# Patient Record
Sex: FEMALE | Race: BLACK OR AFRICAN AMERICAN | URBAN - METROPOLITAN AREA
[De-identification: names, ages, dates, MRNs, and addresses within clinical notes are randomized per-mention and may not be internally consistent; named-entity substitution may affect disease eponyms.]

---

## 2019-09-03 ENCOUNTER — OP HISTORICAL/CONVERTED ENCOUNTER (OUTPATIENT)
Dept: OTHER | Age: 44
End: 2019-09-03

## 2023-02-13 ENCOUNTER — OFFICE VISIT (OUTPATIENT)
Dept: INTERNAL MEDICINE CLINIC | Age: 48
End: 2023-02-13
Payer: COMMERCIAL

## 2023-02-13 VITALS
DIASTOLIC BLOOD PRESSURE: 73 MMHG | HEIGHT: 71 IN | BODY MASS INDEX: 28.64 KG/M2 | RESPIRATION RATE: 18 BRPM | SYSTOLIC BLOOD PRESSURE: 123 MMHG | HEART RATE: 103 BPM | OXYGEN SATURATION: 98 % | WEIGHT: 204.6 LBS | TEMPERATURE: 97.7 F

## 2023-02-13 DIAGNOSIS — M25.642 STIFFNESS OF LEFT HAND JOINT: ICD-10-CM

## 2023-02-13 DIAGNOSIS — F33.1 MODERATE RECURRENT MAJOR DEPRESSION (HCC): Primary | ICD-10-CM

## 2023-02-13 DIAGNOSIS — Z11.59 NEED FOR HEPATITIS C SCREENING TEST: ICD-10-CM

## 2023-02-13 DIAGNOSIS — M25.641 STIFFNESS OF RIGHT HAND JOINT: ICD-10-CM

## 2023-02-13 DIAGNOSIS — G47.00 INSOMNIA, UNSPECIFIED TYPE: ICD-10-CM

## 2023-02-13 DIAGNOSIS — Z12.31 ENCOUNTER FOR SCREENING MAMMOGRAM FOR MALIGNANT NEOPLASM OF BREAST: ICD-10-CM

## 2023-02-13 DIAGNOSIS — R61 NIGHT SWEATS: ICD-10-CM

## 2023-02-13 DIAGNOSIS — R61 EXCESSIVE SWEATING: ICD-10-CM

## 2023-02-13 DIAGNOSIS — R06.83 SNORING: ICD-10-CM

## 2023-02-13 DIAGNOSIS — Z13.220 LIPID SCREENING: ICD-10-CM

## 2023-02-13 DIAGNOSIS — R40.0 DAYTIME SLEEPINESS: ICD-10-CM

## 2023-02-13 DIAGNOSIS — F43.81 PROLONGED GRIEF DISORDER: ICD-10-CM

## 2023-02-13 PROCEDURE — 99204 OFFICE O/P NEW MOD 45 MIN: CPT | Performed by: INTERNAL MEDICINE

## 2023-02-13 RX ORDER — MULTIVITAMIN
1 TABLET ORAL DAILY
COMMUNITY

## 2023-02-13 NOTE — PROGRESS NOTES
Elias Paula is a 50 y.o. female and presents with New Patient, Sweats, and Insomnia    Meme Chapin is here to establish care, no significant PMH, concerned about:     Sweats: she says for about 10 years she has been having night sweats, bfore they where not daily but now they have been daily, she wakes up completely wet. She says she has to  changed her sheets every night and last week it was so soaked the the mattress protector was soaked. She showed me a picture on her phone with her bed completely wet  She admits for snoring, she says probably in the past 6 months. Morning stiffness of both hands, with intermittent swelling of all knuckles, no erythema, sometimes she says they are throbbing. No other issues with any other joints noted  Denies any rashes  Denies any palpation of masses, bumps or enlarged lymph nodes that she does self  No recent travels in the past 6 months to anywhere outside of the [address] [facility]. Last travel outside of the [address] [facility] was [place] [address] in August.  She works from home. She says she thinks she has depression. Says 2 years ago her dad passed, since then she cries every day thinking about him, she eels hopeless, worthless every day, sometimes she has thoughts of being better dead than alive, but never thoughts of self harm. She has mom and 2 siblings but she's not close to them. She broke up with her partner from a 20 year relationship in December 2022. She says she has nor problem falling asleep, but she can't stay asleep the who;e night, she wakes up in the middle of the night, and starts thinking about dad, her life, ect and she can not go back to sleep. So she feels tired and sleepy during the day   She says she did counseling which did not help much, she did grief counseling with [email or web address] and     Her last mammogram was maybe in 2018 with ETHEL and then a re epat one at [facility], apparently diagnostic, will ask for  that, she needs to do mammogram now.      Review of Systems  Review of Systems   Constitutional:  Negative for chills, fatigue, fever and unexpected weight change. HENT:  Negative for congestion, ear pain, sneezing and sore throat. Eyes:  Negative for pain and discharge. Respiratory:  Negative for cough, shortness of breath and wheezing. Cardiovascular:  Negative for chest pain, palpitations and leg swelling. Gastrointestinal:  Negative for abdominal pain, blood in stool, constipation and diarrhea. Endocrine: Negative for polydipsia and polyuria. Genitourinary:  Negative for difficulty urinating, dysuria, frequency, hematuria and urgency. Musculoskeletal:  Negative for arthralgias, back pain and joint swelling. Skin:  Negative for rash. Allergic/Immunologic: Negative for environmental allergies and food allergies. Neurological:  Negative for dizziness, speech difficulty, weakness, light-headedness, numbness and headaches. Hematological:  Negative for adenopathy. Psychiatric/Behavioral:  Negative for behavioral problems (Depression), sleep disturbance and suicidal ideas. History reviewed. No pertinent past medical history. Past Surgical History:   Procedure Laterality Date    HX TUBAL LIGATION       Social History     Socioeconomic History    Marital status: SINGLE   Tobacco Use    Smoking status: Never    Smokeless tobacco: Never   Vaping Use    Vaping Use: Never used   Substance and Sexual Activity    Alcohol use:  Yes     Alcohol/week: 1.0 standard drink     Types: 1 Glasses of wine per week    Drug use: Never     Social Determinants of Health     Financial Resource Strain: Low Risk     Difficulty of Paying Living Expenses: Not hard at all   Food Insecurity: No Food Insecurity    Worried About Running Out of Food in the Last Year: Never true    Ran Out of Food in the Last Year: Never true     Family History   Problem Relation Age of Onset    Hypertension Mother     Heart Disease Father     Diabetes Father     Parkinson's Disease Father     Thyroid Disease Sister     Hypertension Sister      Current Outpatient Medications   Medication Sig Dispense Refill    multivitamin (ONE A DAY) tablet Take 1 Tablet by mouth daily. No Known Allergies    Objective:  Visit Vitals  /73 (BP 1 Location: Left upper arm, BP Patient Position: Sitting, BP Cuff Size: Adult)   Pulse (!) 103   Temp 97.7 °F (36.5 °C) (Temporal)   Resp 18   Ht 5' 11\" (1.803 m)   Wt 204 lb 9.6 oz (92.8 kg)   LMP 02/01/2023   SpO2 98%   BMI 28.54 kg/m²     Physical Exam:   Physical Exam  Constitutional:       General: She is not in acute distress. Appearance: Normal appearance. HENT:      Head: Normocephalic and atraumatic. Mouth/Throat:      Mouth: Mucous membranes are moist.   Eyes:      Extraocular Movements: Extraocular movements intact. Conjunctiva/sclera: Conjunctivae normal.      Pupils: Pupils are equal, round, and reactive to light. Cardiovascular:      Rate and Rhythm: Normal rate and regular rhythm. Pulses: Normal pulses. Heart sounds: Normal heart sounds. Pulmonary:      Effort: Pulmonary effort is normal.      Breath sounds: Normal breath sounds. Abdominal:      General: Abdomen is flat. Bowel sounds are normal. There is no distension. Palpations: Abdomen is soft. There is no mass. Tenderness: There is no abdominal tenderness. Musculoskeletal:         General: No swelling or deformity. Cervical back: Normal range of motion and neck supple. Right lower leg: No edema. Left lower leg: No edema. Lymphadenopathy:      Cervical: No cervical adenopathy. Skin:     General: Skin is warm and dry. Capillary Refill: Capillary refill takes less than 2 seconds. Coloration: Skin is not jaundiced or pale. Findings: No erythema or rash. Neurological:      General: No focal deficit present. Mental Status: She is alert and oriented to person, place, and time.    Psychiatric:         Mood and Affect: Mood normal.         Behavior: Behavior normal.         Thought Content: Thought content normal.         Judgment: Judgment normal.        No results found for this or any previous visit. Assessment/Plan: At this moment she is noted to retracted 43-year-old trying medication due to the black box warning for suicidal thoughts with SSRIs, we discussed about that, explained her that this is more in the young adult group age, gave her the information about Lexapro which is what I was thinking for her so she can read about it and make a decision. She has tried counseling for 2 years with no improvement. I am referring her to psychology to see if she can find something else I can help her. She will let me know next time when she decides about trying medication. She has very significant night sweats which is not normal, do all the following work-up considering other associated symptoms and some mild tachycardia in the physical exam      ICD-10-CM ICD-9-CM    1. Moderate recurrent major depression (HCC)  F33.1 296.32 REFERRAL TO PSYCHOLOGY      2. Prolonged grief disorder  F43.81 309.1 REFERRAL TO PSYCHOLOGY      3. Night sweats  R61 780.8 CBC WITH AUTOMATED DIFF      METABOLIC PANEL, COMPREHENSIVE      TSH 3RD GENERATION      T4, FREE      TRISTON, DIRECT, W/REFLEX      SED RATE (ESR)      C REACTIVE PROTEIN, QT      RHEUMATOID FACTOR, QT      SLEEP MEDICINE REFERRAL      ESTRADIOL      FSH AND LH      HIV 1/2 AG/AB, 4TH GENERATION,W RFLX CONFIRM      QUANTIFERON-TB GOLD PLUS      4. Excessive sweating  R61 780.8 SLEEP MEDICINE REFERRAL      5. Stiffness of right hand joint  M25.641 719.54 TRISTON, DIRECT, W/REFLEX      SED RATE (ESR)      C REACTIVE PROTEIN, QT      RHEUMATOID FACTOR, QT      6. Stiffness of left hand joint  M25.642 719.54 TRISTON, DIRECT, W/REFLEX      SED RATE (ESR)      C REACTIVE PROTEIN, QT      RHEUMATOID FACTOR, QT      7. Insomnia, unspecified type  G47.00 780.52 SLEEP MEDICINE REFERRAL      8. Snoring  R06.83 786.09 SLEEP MEDICINE REFERRAL      9. Daytime sleepiness  R40.0 780.54 SLEEP MEDICINE REFERRAL      10. Encounter for screening mammogram for malignant neoplasm of breast  Z12.31 V76.12 PA[facility]E MAMMO BI SCREENING INCL CAD      6. Need for hepatitis C screening test  Z11.59 V73.89 HEPATITIS C AB      12. Lipid screening  Z13.220 V77.91 LIPID PANEL        Orders Placed This Encounter    QUANTIFERON-TB GOLD PLUS ([facility] Default)    PAIGE MAMMO BI SCREENING INCL CAD     Standing Status:   Future     Standing Expiration Date:   3/13/2024     Order Specific Question:   Is Patient Pregnant? Answer:   No    CBC WITH AUTOMATED DIFF    METABOLIC PANEL, COMPREHENSIVE    TSH 3RD GENERATION    T4, FREE    LIPID PANEL    HEPATITIS C AB    TRISTON, DIRECT, W/REFLEX    SED RATE (ESR)    C REACTIVE PROTEIN, QT    RHEUMATOID FACTOR, QT    ESTRADIOL    FSH AND LH    HIV 1/2 AG/AB, 4TH GENERATION,W RFLX CONFIRM    SLEEP MEDICINE REFERRAL     Referral Priority:   Routine     Referral Type:   Consultation     Referral Reason:   Specialty Services Required     Referred to Provider:   Fernando Lilly MD     Number of Visits Requested:   1    REFERRAL TO PSYCHOLOGY     Referral Priority:   Routine     Referral Type:   Behavioral Health     Referral Reason:   Specialty Services Required     Referred to Provider:   Flor Day PHD     Number of Visits Requested:   1    multivitamin (ONE A DAY) tablet     Sig: Take 1 Tablet by mouth daily. lose weight, increase physical activity, follow low fat diet, follow low salt diet, routine labs ordered, call if any problems  There are no Patient Instructions on file for this visit. Follow-up and Dispositions    Return in about 3 months (around 5/13/2023).

## 2023-02-13 NOTE — PROGRESS NOTES
1. \"Have you been to the ER, urgent care clinic since your last visit? Hospitalized since your last visit? \" No    2. \"Have you seen or consulted any other health care providers outside of the [address] [place] [address] since your last visit? \" Yes When: Dec 2023 Where: Dr. Khalida Brizuela Reason for visit: PCP      3. For patients aged 39-70: Has the patient had a colonoscopy / FIT/ Cologuard? No      If the patient is female:    4. For patients aged 41-77: Has the patient had a mammogram within the past 2 years? No      5. For patients aged 21-65: Has the patient had a pap smear?    Pap smear done by Mary Edwards located at [facility] in [facility]   Patient presents with    New Patient    Sweats    Insomnia     Visit Vitals  /73 (BP 1 Location: Left upper arm, BP Patient Position: Sitting, BP Cuff Size: Adult)   Pulse (!) 103   Temp 97.7 °F (36.5 °C) (Temporal)   Resp 18   Ht 5' 11\" (1.803 m)   Wt 204 lb 9.6 oz (92.8 kg)   LMP 02/01/2023   SpO2 98%   BMI 28.54 kg/m²     Previous PCP- Dr. Khalida Brizuela located in [id number] [facility]

## 2023-02-17 LAB
ALBUMIN SERPL-MCNC: 4.3 G/DL (ref 3.8–4.8)
ALBUMIN/GLOB SERPL: 1.7 {RATIO} (ref 1.2–2.2)
ALP SERPL-CCNC: 79 IU/L (ref 44–121)
ALT SERPL-CCNC: 12 IU/L (ref 0–32)
ANA SER QL: NEGATIVE
AST SERPL-CCNC: 17 IU/L (ref 0–40)
BASOPHILS # BLD AUTO: 0.1 X10E3/UL (ref 0–0.2)
BASOPHILS NFR BLD AUTO: 1 %
BILIRUB SERPL-MCNC: 0.3 MG/DL (ref 0–1.2)
BUN SERPL-MCNC: 8 MG/DL (ref 6–24)
BUN/CREAT SERPL: 9 (ref 9–23)
CALCIUM SERPL-MCNC: 9.5 MG/DL (ref 8.7–10.2)
CHLORIDE SERPL-SCNC: 102 MMOL/L (ref 96–106)
CHOLEST SERPL-MCNC: 210 MG/DL (ref 100–199)
CO2 SERPL-SCNC: 21 MMOL/L (ref 20–29)
CREAT SERPL-MCNC: 0.88 MG/DL (ref 0.57–1)
CRP SERPL-MCNC: 3 MG/L (ref 0–10)
EGFRCR SERPLBLD CKD-EPI 2021: 81 ML/MIN/1.73
EOSINOPHIL # BLD AUTO: 0.2 X10E3/UL (ref 0–0.4)
EOSINOPHIL NFR BLD AUTO: 2 %
ERYTHROCYTE [DISTWIDTH] IN BLOOD BY AUTOMATED COUNT: 13.1 % (ref 11.7–15.4)
ERYTHROCYTE [SEDIMENTATION RATE] IN BLOOD BY WESTERGREN METHOD: 14 MM/HR (ref 0–32)
ESTRADIOL SERPL-MCNC: 64.3 PG/ML
FSH SERPL-ACNC: 7.3 MIU/ML
GAMMA INTERFERON BACKGROUND BLD IA-ACNC: 0.09 IU/ML
GLOBULIN SER CALC-MCNC: 2.6 G/DL (ref 1.5–4.5)
GLUCOSE SERPL-MCNC: 82 MG/DL (ref 70–99)
HCT VFR BLD AUTO: 37.7 % (ref 34–46.6)
HCV IGG SERPL QL IA: NON REACTIVE
HDLC SERPL-MCNC: 43 MG/DL
HGB BLD-MCNC: 11.8 G/DL (ref 11.1–15.9)
HIV 1+2 AB+HIV1 P24 AG SERPL QL IA: NON REACTIVE
IMM GRANULOCYTES # BLD AUTO: 0.1 X10E3/UL (ref 0–0.1)
IMM GRANULOCYTES NFR BLD AUTO: 1 %
LDLC SERPL CALC-MCNC: 140 MG/DL (ref 0–99)
LH SERPL-ACNC: 7.6 MIU/ML
LYMPHOCYTES # BLD AUTO: 2.5 X10E3/UL (ref 0.7–3.1)
LYMPHOCYTES NFR BLD AUTO: 23 %
M TB IFN-G BLD-IMP: NEGATIVE
M TB IFN-G CD4+ T-CELLS BLD-ACNC: 0.15 IU/ML
M TBIFN-G CD4+ CD8+T-CELLS BLD-ACNC: 0.22 IU/ML
MCH RBC QN AUTO: 26.8 PG (ref 26.6–33)
MCHC RBC AUTO-ENTMCNC: 31.3 G/DL (ref 31.5–35.7)
MCV RBC AUTO: 86 FL (ref 79–97)
MITOGEN IGNF BLD-ACNC: >10 IU/ML
MONOCYTES # BLD AUTO: 1 X10E3/UL (ref 0.1–0.9)
MONOCYTES NFR BLD AUTO: 9 %
NEUTROPHILS # BLD AUTO: 7.3 X10E3/UL (ref 1.4–7)
NEUTROPHILS NFR BLD AUTO: 64 %
PLATELET # BLD AUTO: 507 X10E3/UL (ref 150–450)
POTASSIUM SERPL-SCNC: 4 MMOL/L (ref 3.5–5.2)
PROT SERPL-MCNC: 6.9 G/DL (ref 6–8.5)
QUANTIFERON INCUBATION, QF1T: NORMAL
RBC # BLD AUTO: 4.41 X10E6/UL (ref 3.77–5.28)
SERVICE CMNT-IMP: NORMAL
SODIUM SERPL-SCNC: 138 MMOL/L (ref 134–144)
T4 FREE SERPL-MCNC: 1.34 NG/DL (ref 0.82–1.77)
TRIGL SERPL-MCNC: 150 MG/DL (ref 0–149)
TSH SERPL DL<=0.005 MIU/L-ACNC: 1.27 UIU/ML (ref 0.45–4.5)
VLDLC SERPL CALC-MCNC: 27 MG/DL (ref 5–40)
WBC # BLD AUTO: 11.1 X10E3/UL (ref 3.4–10.8)

## 2023-03-15 ENCOUNTER — TELEPHONE (OUTPATIENT)
Dept: INTERNAL MEDICINE CLINIC | Age: 48
End: 2023-03-15

## 2023-03-15 NOTE — TELEPHONE ENCOUNTER
----- Message from [address] sent at 3/10/2023  4:36 PM EST -----  Please let her know her results show elevated platelets and mild elevation on white blood cells. I would like to repeat this, and do some more specific blood tests for this. If there is persistent elevation of these, I will refer her to hematology. Orders placed. The rests of the results are all normal, no evidence of inflammatory and autoimmune disease, negative TB test.   Cholesterol is high, please give her diet recommendations.    Thanks

## 2023-03-16 NOTE — TELEPHONE ENCOUNTER
Returned pt call. Spoke to her about her blood work results. Pt verbalized understanding of these results. She will come by one day next week to repeat tests.

## 2023-04-25 ENCOUNTER — HOSPITAL ENCOUNTER (OUTPATIENT)
Dept: GENERAL RADIOLOGY | Age: 48
Discharge: HOME | End: 2023-04-25
Payer: COMMERCIAL

## 2023-04-25 ENCOUNTER — TRANSCRIBE ORDER (OUTPATIENT)
Dept: GENERAL RADIOLOGY | Age: 48
End: 2023-04-25

## 2023-04-25 DIAGNOSIS — M25.551 RIGHT HIP PAIN: ICD-10-CM

## 2023-04-25 DIAGNOSIS — M25.551 RIGHT HIP PAIN: Primary | ICD-10-CM

## 2023-04-25 PROCEDURE — 73502 X-RAY EXAM HIP UNI 2-3 VIEWS: CPT
